# Patient Record
Sex: FEMALE | Race: WHITE
[De-identification: names, ages, dates, MRNs, and addresses within clinical notes are randomized per-mention and may not be internally consistent; named-entity substitution may affect disease eponyms.]

---

## 2017-06-02 ENCOUNTER — HOSPITAL ENCOUNTER (EMERGENCY)
Dept: HOSPITAL 25 - UCCORT | Age: 52
Discharge: HOME | End: 2017-06-02
Payer: COMMERCIAL

## 2017-06-02 VITALS — DIASTOLIC BLOOD PRESSURE: 65 MMHG | SYSTOLIC BLOOD PRESSURE: 137 MMHG
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
## 2017-06-02 DIAGNOSIS — Z88.0: ICD-10-CM
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
## 2017-06-02 DIAGNOSIS — S80.262A: Primary | ICD-10-CM

## 2017-06-02 PROCEDURE — 99212 OFFICE O/P EST SF 10 MIN: CPT

## 2017-06-02 PROCEDURE — G0463 HOSPITAL OUTPT CLINIC VISIT: HCPCS

## 2017-06-02 NOTE — UC
Skin Complaint HPI





- HPI Summary


HPI Summary: 





52 yo female with tick bite noted right popliteal fossa today





unsure how long with was attached





? yesterday











- History of Current Complaint


Chief Complaint: UCSkin


Time Seen by Provider: 06/02/17 09:09


Stated Complaint: TICK


Hx Last Menstrual Period: Thermal abalsion


Onset/Duration: Still Present


Timing: Constant


Onset Severity: Mild


Current Severity: None


Pain Intensity: 0


Pain Scale Used: 0-10 Numeric


Location: Other - popliteal fossa


Character: Redness


Alleviating: Nothing


Associated Signs & Symptoms: Positive: Negative


Related History: Insect Bite/Sting





- Allergy/Home Medications


Allergies/Adverse Reactions: 


 Allergies











Allergy/AdvReac Type Severity Reaction Status Date / Time


 


Penicillins Allergy Severe Hives Verified 06/02/17 08:59











Home Medications: 


 Home Medications





DULoxetine DR CAP* [Cymbalta CAP*] 1 cap DAILY 06/02/17 [History Confirmed 06/02 /17]


Naproxen TAB* [Naprosyn 250 mg TAB*] 2 tab BID PRN 06/02/17 [History Confirmed 

06/02/17]











Review of Systems


Constitutional: Negative


Skin: Negative


Eyes: Negative


ENT: Negative


Respiratory: Negative


Cardiovascular: Negative


Gastrointestinal: Negative


Genitourinary: Negative


Motor: Negative


Neurovascular: Negative


Musculoskeletal: Negative


Neurological: Negative


Psychological: Negative


All Other Systems Reviewed And Are Negative: Yes





PMH/Surg Hx/FS Hx/Imm Hx


Previously Healthy: Yes - RA/Sjorgens





- Surgical History


Surgical History: Yes


Surgery Procedure, Year, and Place: D&C; pelvis fixation





- Family History


Known Family History: Positive: Diabetes, Other - RA


   Negative: Cardiac Disease, Hypertension





- Social History


Alcohol Use: Rare


Substance Use Type: None


Smoking Status (MU): Never Smoked Tobacco





- Immunization History


Most Recent Influenza Vaccination: NONE


Most Recent Tetanus Shot: UTD


Most Recent Pneumonia Vaccination: N/A





Physical Exam


Triage Information Reviewed: Yes


Appearance: Well-Appearing, No Pain Distress, Well-Nourished


Vital Signs: 


 Initial Vital Signs











Temp  99.3 F   06/02/17 09:02


 


Pulse  66   06/02/17 09:02


 


Resp  16   06/02/17 09:02


 


BP  137/65   06/02/17 09:02


 


Pulse Ox  98   06/02/17 09:02











Vital Signs Reviewed: Yes


Eyes: Positive: Conjunctiva Inflamed


ENT: Positive: Hearing grossly normal, Pharynx normal, Pharyngeal erythema


Neck: Positive: Supple, Nontender, No Lymphadenopathy


Respiratory: Positive: Lungs clear, Normal breath sounds, No respiratory 

distress


Cardiovascular: Positive: RRR, No Murmur


Musculoskeletal: Positive: ROM Intact, No Edema


Neurological: Positive: Alert, Muscle Tone Normal


Psychological Exam: Normal


Skin Exam: Normal - left popliteal fossa/engorged, 1 cm of erthema, Other





Course/Dx





- Course


Course Of Treatment: tick removed in toto with tick twister





- Diagnoses


Provider Diagnoses: tick bite.  lyme disease prophylaxis





Discharge





- Discharge Plan


Condition: Stable


Disposition: HOME


Prescriptions: 


DOXYcycline CAP(*) [DOXYcycline 100MG CAP(*)] 200 mg PO DAILY #2 cap


Patient Education Materials:  Tick Bite (ED)


Referrals: 


Berenice Hodge MD [Primary Care Provider] - 2 Weeks (if needed)


Additional Instructions: 


tick was engorged





I suggest a prophylatic dose of doxy   2 pills with food today...this is only 

effective for this bite and not future bites





call for any questions





return for any problems

## 2018-09-16 ENCOUNTER — HOSPITAL ENCOUNTER (EMERGENCY)
Dept: HOSPITAL 25 - UCCORT | Age: 53
Discharge: HOME | End: 2018-09-16
Payer: COMMERCIAL

## 2018-09-16 VITALS — SYSTOLIC BLOOD PRESSURE: 134 MMHG | DIASTOLIC BLOOD PRESSURE: 74 MMHG

## 2018-09-16 PROCEDURE — 99212 OFFICE O/P EST SF 10 MIN: CPT

## 2018-09-16 PROCEDURE — G0463 HOSPITAL OUTPT CLINIC VISIT: HCPCS

## 2018-09-16 NOTE — UC
Ear Complaint HPI





- HPI Summary


HPI Summary: 





Allergies have been acting up lately, R ear pain radiating to cheek and down 

into neck for several days worsening. Denies ear drainage or fever. 





Does have hx of grinding teeth, has bite guard recommended by dentist. States 

her pain is constant and pretty significant, has a hard time focusing on 

anything else.





- History of Current Complaint


Chief Complaint: UCEar


Stated Complaint: RIGHT EAR PAIN


Time Seen by Provider: 09/16/18 15:19


Hx Obtained From: Patient


Hx Last Menstrual Period: Thermal abalsion


Pregnant?: No


Onset/Duration: Gradual Onset, Lasting Days


Severity Initially: Mild


Severity Currently: Moderate


Pain Intensity: 4


Alleviating Factors: Nothing


Associated Signs/Symptoms: Negative: Trauma to Ear





- Allergies/Home Medications


Allergies/Adverse Reactions: 


 Allergies











Allergy/AdvReac Type Severity Reaction Status Date / Time


 


MS Penicillins [Penicillins] Allergy Severe Hives Verified 06/02/17 08:59











Home Medications: 


 Home Medications





Folic Acid TAB* [Folvite TAB*] 1 tab PO DAILY 09/16/18 [History Confirmed 09/16/ 18]


Methotrexate TAB* 4 tab PO WEEKLY 09/16/18 [History Confirmed 09/16/18]


Pseudoephedrine TAB* [Sudafed TAB*] 30 mg PO DAILY 09/16/18 [History Confirmed 

09/16/18]











PMH/Surg Hx/FS Hx/Imm Hx





- Additional Past Medical History


Additional PMH: 





RA, Sjogren's syndrome





- Surgical History


Surgical History: Yes


Surgery Procedure, Year, and Place: D&C; pelvis fixation. [facility]





- Family History


Known Family History: Positive: Diabetes, Other - RA


   Negative: Cardiac Disease, Hypertension





- Social History


Lives: With Family


Alcohol Use: Rare


Substance Use Type: None


Smoking Status (MU): Never Smoked Tobacco





- Immunization History


Most Recent Influenza Vaccination: NONE


Most Recent Tetanus Shot: UTD


Most Recent Pneumonia Vaccination: N/A





Review of Systems


Constitutional: Negative


Skin: Negative


Eyes: Negative


ENT: Ear Ache, Sinus Pain/Tenderness


Respiratory: Negative


Cardiovascular: Negative


Gastrointestinal: Negative


Genitourinary: Negative


Motor: Negative


Neurovascular: Negative


Musculoskeletal: Negative


Neurological: Negative


Psychological: Negative


Is Patient Immunocompromised?: No


All Other Systems Reviewed And Are Negative: Yes





Physical Exam


Triage Information Reviewed: Yes


Appearance: Well-Appearing, Well-Nourished, Obese


Vital Signs: 


 Initial Vital Signs











Temp  98.6 F   09/16/18 15:09


 


Pulse  65   09/16/18 15:09


 


Resp  17   09/16/18 15:09


 


BP  134/74   09/16/18 15:09


 


Pulse Ox  99   09/16/18 15:09











Vital Signs Reviewed: Yes


Eye Exam: Normal


Eyes: Positive: Conjunctiva Clear


ENT: Positive: Hearing grossly normal, TMs normal - bubbles, clear fluid noted 

bilat, Other - mild tenderness r TMJ.  Negative: Nasal congestion, TM bulging, 

TM dull, TM red, Dental tenderness


Dental Exam: Normal


Dental: Negative: Percussion Tenderness @, Gross Decay/Caries @, Dental 

Fracture @, Abscess @


Neck exam: Normal


Respiratory Exam: Normal


Respiratory: Positive: Chest non-tender, Lungs clear, Normal breath sounds, No 

respiratory distress, No accessory muscle use


Cardiovascular Exam: Normal


Cardiovascular: Positive: RRR, No Murmur


Musculoskeletal Exam: Normal


Neurological Exam: Normal


Neurological: Positive: Alert


Psychological Exam: Normal


Skin Exam: Normal





Ear Complaint Course/Dx





- Differential Dx/Diagnosis


Provider Diagnoses: Allergic rhinitis.  R TMJ syndrome





Discharge





- Sign-Out/Discharge


Documenting (check all that apply): Patient Departure


All imaging exams completed and their final reports reviewed: No Studies





- Discharge Plan


Condition: Stable


Disposition: HOME


Prescriptions: 


Cyclobenzaprine (NF) [Cyclobenzaprine 5 MG (NF)] 5 - 10 mg PO BEDTIME PRN #30 

tab


 PRN Reason: Pain


Mometasone Furoate [Nasonex] 2 spray INH BID #1 spray.pump


Naproxen [Naproxen 375 mg tab] 375 mg PO TID #30 tablet.


Patient Education Materials:  Allergic Rhinitis (ED), Temporomandibular 

Disorder (ED)


Referrals: 


Berenice Hodge MD [Primary Care Provider] - 


Additional Instructions: 


Restart a nasal steroid and take a daily antihistamine with pseudoephedrine 

such as Allegra D.





Use the muscle relaxer at bedtime with naproxen as needed for pain -- go on a 

low or no-chewing diet until your pain resolves. 





- Billing Disposition and Condition


Condition: STABLE


Disposition: Home